# Patient Record
Sex: FEMALE | Race: WHITE | NOT HISPANIC OR LATINO | Employment: UNEMPLOYED | URBAN - METROPOLITAN AREA
[De-identification: names, ages, dates, MRNs, and addresses within clinical notes are randomized per-mention and may not be internally consistent; named-entity substitution may affect disease eponyms.]

---

## 2017-11-30 ENCOUNTER — OFFICE VISIT (OUTPATIENT)
Dept: PULMONOLOGY | Facility: CLINIC | Age: 57
End: 2017-11-30

## 2017-11-30 VITALS
RESPIRATION RATE: 16 BRPM | HEIGHT: 64 IN | HEART RATE: 83 BPM | DIASTOLIC BLOOD PRESSURE: 88 MMHG | WEIGHT: 174.8 LBS | TEMPERATURE: 97.6 F | SYSTOLIC BLOOD PRESSURE: 148 MMHG | BODY MASS INDEX: 29.84 KG/M2 | OXYGEN SATURATION: 95 %

## 2017-11-30 DIAGNOSIS — Z92.25 PERSONAL HISTORY OF IMMUNOSUPPRESSIVE THERAPY: ICD-10-CM

## 2017-11-30 DIAGNOSIS — I73.00 RAYNAUD'S DISEASE WITHOUT GANGRENE: ICD-10-CM

## 2017-11-30 DIAGNOSIS — M85.89 OSTEOPENIA OF MULTIPLE SITES: ICD-10-CM

## 2017-11-30 DIAGNOSIS — I77.6 VASCULITIS (HCC): ICD-10-CM

## 2017-11-30 DIAGNOSIS — R05.9 COUGH: Primary | ICD-10-CM

## 2017-11-30 PROCEDURE — 99204 OFFICE O/P NEW MOD 45 MIN: CPT | Performed by: NURSE PRACTITIONER

## 2017-11-30 PROCEDURE — 94726 PLETHYSMOGRAPHY LUNG VOLUMES: CPT | Performed by: NURSE PRACTITIONER

## 2017-11-30 PROCEDURE — 94375 RESPIRATORY FLOW VOLUME LOOP: CPT | Performed by: NURSE PRACTITIONER

## 2017-11-30 PROCEDURE — 94729 DIFFUSING CAPACITY: CPT | Performed by: NURSE PRACTITIONER

## 2017-11-30 RX ORDER — AZATHIOPRINE 50 MG/1
50 TABLET ORAL DAILY
COMMUNITY

## 2017-11-30 RX ORDER — PRAVASTATIN SODIUM 20 MG
1 TABLET ORAL DAILY
COMMUNITY
Start: 2017-09-01

## 2017-11-30 RX ORDER — ASPIRIN 81 MG/1
81 TABLET, CHEWABLE ORAL
COMMUNITY

## 2017-11-30 RX ORDER — AMITRIPTYLINE HYDROCHLORIDE 10 MG/1
1 TABLET, FILM COATED ORAL DAILY
COMMUNITY
Start: 2017-11-29

## 2017-11-30 RX ORDER — DULAGLUTIDE 1.5 MG/.5ML
INJECTION, SOLUTION SUBCUTANEOUS
COMMUNITY
Start: 2017-10-05

## 2017-11-30 RX ORDER — AMOXICILLIN AND CLAVULANATE POTASSIUM 875; 125 MG/1; MG/1
TABLET, FILM COATED ORAL
COMMUNITY
Start: 2017-11-22

## 2017-11-30 RX ORDER — METOPROLOL SUCCINATE 50 MG/1
1 TABLET, EXTENDED RELEASE ORAL DAILY
COMMUNITY
Start: 2017-11-06

## 2017-11-30 RX ORDER — LOSARTAN POTASSIUM 100 MG/1
1 TABLET ORAL DAILY
COMMUNITY
Start: 2017-10-16

## 2017-11-30 RX ORDER — LANSOPRAZOLE 30 MG/1
1 CAPSULE, DELAYED RELEASE ORAL DAILY
COMMUNITY
Start: 2017-10-06

## 2017-11-30 RX ORDER — ESCITALOPRAM OXALATE 20 MG/1
1 TABLET ORAL DAILY
COMMUNITY
Start: 2017-11-29

## 2017-11-30 RX ORDER — MELOXICAM 7.5 MG/1
7.5 TABLET ORAL DAILY
COMMUNITY

## 2017-11-30 RX ORDER — ROPINIROLE 1 MG/1
1 TABLET, FILM COATED ORAL DAILY
COMMUNITY
Start: 2017-09-01

## 2017-12-01 PROBLEM — M85.89 OSTEOPENIA OF MULTIPLE SITES: Status: ACTIVE | Noted: 2017-12-01

## 2017-12-01 PROBLEM — I77.6 VASCULITIS (HCC): Status: ACTIVE | Noted: 2017-12-01

## 2017-12-01 PROBLEM — I73.00 RAYNAUD'S DISEASE WITHOUT GANGRENE: Status: ACTIVE | Noted: 2017-12-01

## 2017-12-01 PROBLEM — Z92.25 PERSONAL HISTORY OF IMMUNOSUPPRESSIVE THERAPY: Status: ACTIVE | Noted: 2017-12-01

## 2017-12-01 NOTE — PROGRESS NOTES
[facility] Pulmonary Evaluation    CHIEF COMPLAINT    Cough    Refered by:  Dr Anna Lynn        HISTORY OF PRESENT ILLNESS    Angie Wellington is a 57 y.o.female here today for follow up on a cough.  She has had several bouts of pneumonia this year was hospitalized once for several days.  She had a CT scan done in August right after the most recent bout of pneumonia that did show some atelectasis and postinflammatory changes on the left.  At this point she is completed 3 rounds of antibiotics and feels significantly better.  She has no cough or sputum production.  No wheezing.  No fevers or chills.  She is currently on Augmentin for a sinus infection.  She has been on immunosuppressive therapy for around 4 years.  She has a history of vasculitis diagnosed in 2006 after she had a severe headache as well as weakness and vision changes which they felt was a CVA, but her workup was negative.  She follows up with Dr. Parker and Dr. Alonso at [place].    She does have some daytime fatigue and shortness of breath.  She does get a bit short of breath towards the end of the day.  She denies any headaches or dizziness in the mornings.  She has a chronic headache for her history of vasculitis.    Patient Active Problem List   Diagnosis   • Vasculitis   • Osteopenia of multiple sites   • Raynaud's disease without gangrene   • Personal history of immunosuppressive therapy       No Known Allergies    Current Outpatient Prescriptions:   •  amitriptyline (ELAVIL) 10 MG tablet, 1 tablet Daily., Disp: , Rfl:   •  amoxicillin-clavulanate (AUGMENTIN) 875-125 MG per tablet, , Disp: , Rfl:   •  aspirin 81 MG chewable tablet, Chew 81 mg., Disp: , Rfl:   •  azaTHIOprine (IMURAN) 50 MG tablet, Take 50 mg by mouth Daily., Disp: , Rfl:   •  Calcium-Magnesium-Vitamin D (CALCIUM 500 PO), 1,500 mg Daily., Disp: , Rfl:   •  escitalopram (LEXAPRO) 20 MG tablet, 1 tablet Daily., Disp: , Rfl:   •  insulin aspart (novoLOG FLEXPEN) 100 UNIT/ML solution  "pen-injector sc pen, Inject  under the skin 3 (Three) Times a Day With Meals., Disp: , Rfl:   •  Insulin Glargine (LANTUS SOLOSTAR) 100 UNIT/ML injection pen, Inject  under the skin., Disp: , Rfl:   •  lansoprazole (PREVACID) 30 MG capsule, 1 capsule Daily., Disp: , Rfl:   •  losartan (COZAAR) 100 MG tablet, 1 tablet Daily., Disp: , Rfl:   •  meloxicam (MOBIC) 7.5 MG tablet, Take 7.5 mg by mouth Daily., Disp: , Rfl:   •  metoprolol succinate XL (TOPROL-XL) 50 MG 24 hr tablet, 1 tablet Daily., Disp: , Rfl:   •  pravastatin (PRAVACHOL) 20 MG tablet, 1 tablet Daily., Disp: , Rfl:   •  RiTUXimab (RITUXAN) 10 MG/ML solution injection, Infuse  into a venous catheter Every 4 (Four) Months., Disp: , Rfl:   •  rOPINIRole (REQUIP) 1 MG tablet, 1 tablet Daily., Disp: , Rfl:   •  TRULICITY 1.5 MG/0.5ML solution pen-injector, , Disp: , Rfl:     MEDICATION LIST AND ALLERGIES REVIEWED.    Social History   Substance Use Topics   • Smoking status: Not on file   • Smokeless tobacco: Not on file   • Alcohol use Not on file       FAMILY AND SOCIAL HISTORY REVIEWED AND UPDATED IN EPIC    Review of Systems   Constitutional: Negative for chills, fatigue, fever and unexpected weight change.   HENT: Negative for congestion, nosebleeds, postnasal drip, rhinorrhea, sinus pressure and trouble swallowing.    Respiratory: Negative for cough, chest tightness, shortness of breath and wheezing.    Cardiovascular: Negative for chest pain and leg swelling.   Gastrointestinal: Negative for abdominal pain, constipation, diarrhea, nausea and vomiting.   Genitourinary: Negative for dysuria, frequency, hematuria and urgency.   Musculoskeletal: Negative for myalgias.   Neurological: Negative for dizziness, weakness, numbness and headaches.   All other systems reviewed and are negative.  .    /88  Pulse 83  Temp 97.6 °F (36.4 °C)  Resp 16  Ht 63.5\" (161.3 cm)  Wt 174 lb 12.8 oz (79.3 kg)  SpO2 95% Comment: RA  BMI 30.48 kg/m2  Physical Exam "   Constitutional: She is oriented to person, place, and time. She appears well-developed and well-nourished.   HENT:   Head: Normocephalic and atraumatic.   Eyes: EOM are normal. Pupils are equal, round, and reactive to light.   Neck: Normal range of motion. Neck supple.   Cardiovascular: Normal rate and regular rhythm.    No murmur heard.  Pulmonary/Chest: Effort normal and breath sounds normal. No respiratory distress. She has no wheezes. She has no rales.   Abdominal: Soft. Bowel sounds are normal. She exhibits no distension.   Musculoskeletal: Normal range of motion. She exhibits no edema.   Neurological: She is alert and oriented to person, place, and time.   Skin: Skin is warm and dry. No erythema.   Psychiatric: She has a normal mood and affect. Her behavior is normal.   Vitals reviewed.      RESULTS    CT Chest, 8/2017 [facility]  Small amount of pleural parenchymal banding opacity seen in the left base, mild atelectasis.  No nodules or adenopathy.    PFTs done in the office today, and read by me:  No obstruction or restriction, FVC 2.63 81 percent.  FEV1 2.16, 86%.  Total lung capacity 91%  Normal diffusion with an adjusted DLCO at 104.    PA/LAT CXR done in the office today, and reviewed by me:      PROBLEM LIST    Problem List Items Addressed This Visit        Cardiovascular and Mediastinum    Vasculitis    Raynaud's disease without gangrene       Musculoskeletal and Integument    Osteopenia of multiple sites       Other    Personal history of immunosuppressive therapy    Overview     On Imuran, chronic steroids,Rituximab for history of vasculitis           Other Visit Diagnoses     Cough    -  Primary    Relevant Orders    XR Chest PA & Lateral    Pulmonary Function Test (Completed)            DISCUSSION    Currently her systems are resolved, she has no cough, fever, shortness of breath or wheezing.  But with her abnormal CT scan and history of recurrent rhonchi dust and pneumonia issues  I like to follow-up and make sure that that abnormal area has cleared.  Given her immunocompromised state I would recommend a bronchoscopy if she has any further symptoms of worsening cough, congestion, bronchitis issues, or recurrent pneumonia.  We did discuss this in the office today and she is return to the clinic if any symptoms reappear.    We also discussed the need for a sleep study in the future.  She does have some evidence of daytime fatigue, dyspnea, and worsening shortness of breath towards the evenings.  She has never had a sleep study to rule out objective sleep apnea.  She would like to defer that at this time, follow up with her primary care provider closer to home.  She lives 3 hours away.    Again we discussed returning to the clinic with any episodes of worsening cough or congestion or a recurrent episode of bronchitis or pneumonia for further evaluation with bronchoscopy, she would be at high risk for an atypical infection.  I would like to follow-up on her CT scan to assure resolution of her atelectasis.    Tayler Valdez, APRN  11/30/20178:31 AM  Electronically signed     Please note that portions of this note were completed with a voice recognition program. Efforts were made to edit the dictations, but occasionally words are mistranscribed.      CC: Markie Delacruz, DO

## 2017-12-15 ENCOUNTER — TELEPHONE (OUTPATIENT)
Dept: PULMONOLOGY | Facility: CLINIC | Age: 57
End: 2017-12-15

## 2018-01-02 ENCOUNTER — HOSPITAL ENCOUNTER (OUTPATIENT)
Dept: PET IMAGING | Facility: HOSPITAL | Age: 58
Discharge: HOME | End: 2018-01-02
Admitting: NURSE PRACTITIONER

## 2018-01-02 PROCEDURE — 71250 CT THORAX DX C-: CPT

## 2021-11-23 ENCOUNTER — TRANSCRIBE ORDERS (OUTPATIENT)
Dept: PHYSICAL THERAPY | Facility: CLINIC | Age: 61
End: 2021-11-23

## 2021-11-23 ENCOUNTER — TREATMENT (OUTPATIENT)
Dept: PHYSICAL THERAPY | Facility: CLINIC | Age: 61
End: 2021-11-23

## 2021-11-23 DIAGNOSIS — M19.042 OSTEOARTHRITIS OF FINGER OF LEFT HAND: Primary | ICD-10-CM

## 2021-11-23 DIAGNOSIS — Z96.698 S/P FINGER JOINT REPLACEMENT: ICD-10-CM

## 2021-11-23 DIAGNOSIS — Z47.89 ORTHOPEDIC AFTERCARE: ICD-10-CM

## 2021-11-23 PROCEDURE — L3923 HFO WITHOUT JOINTS PRE CST: HCPCS | Performed by: PHYSICAL THERAPIST

## 2021-11-23 NOTE — PROGRESS NOTES
Angie Wellington 1960   Diagnosis/ Surgery: L index PIPJ advanced OA with deformity, s/p PIPJ replacement               Date Of Injury: chronic issue   Date Of Surgery:11/11/21    Hand Dominance: R   History of Present Condition: progressive severe OA, s/p PIPJ replacement with plates.   Medical/Vocational History/ Medications: retired. Does a lot of sewing.     Pain: 0/10    Edema: mod-sev index   Sensibility: WNL   Wound Status: dorsal PIPJ, stitches removed.   ROM/ Strength: NT     Splinting:  · Patient was measure and fit with a custom fabricated digital gutter splint.    · Patient was instructed in wearing schedule, precautions and care of the splint during this visit.   · Patient was instructed in proper donning/doffing of splint.   Assessment:  · Patient was fitted and appropriate splint was fabricated this date.  · Patient reported that splint was comfortable and had no complications with the fit of the splint.  · Patient was instructed and patient verbalized understanding of precautions, wear and care of the splint.   · Patient demonstrated independent donning/doffing of splint during treatment today.  Goals:  · Patient was fitted properly with appropriate splint for diagnosis  · Patient was educated on precautions, wear schedule and care of splint  · Patient demonstrated independence with donning/doffing of the splint.  · Splint was provided to Protect Healing Structures, Restrict Mobility, Improve joint alignment.  Plan:  · No additional treatment is required for this patient at this time. The patient is therefore discharged from therapy.  · Patient advised to contact therapist with any additional questions or concerns regarding the fit and function of the splint.  · Patient will be seen for splint issues as needed   · Wear Instructions: Off for hygiene       PT SIGNATURE: Maia Solitario, PT   DATE TREATMENT INITIATED: 11/23/2021    Physician Signature____________________________________ Date____________

## 2022-01-14 ENCOUNTER — TRANSCRIBE ORDERS (OUTPATIENT)
Dept: PHYSICAL THERAPY | Facility: CLINIC | Age: 62
End: 2022-01-14

## 2022-01-14 ENCOUNTER — TREATMENT (OUTPATIENT)
Dept: PHYSICAL THERAPY | Facility: CLINIC | Age: 62
End: 2022-01-14

## 2022-01-14 DIAGNOSIS — M19.042 OSTEOARTHRITIS OF FINGER OF LEFT HAND: Primary | ICD-10-CM

## 2022-01-14 DIAGNOSIS — Z96.698 S/P FINGER JOINT REPLACEMENT: Primary | ICD-10-CM

## 2022-01-14 DIAGNOSIS — Z47.89 ORTHOPEDIC AFTERCARE: ICD-10-CM

## 2022-01-14 DIAGNOSIS — M19.042 OSTEOARTHRITIS OF FINGER OF LEFT HAND: ICD-10-CM

## 2022-01-14 DIAGNOSIS — Z96.698 S/P FINGER JOINT REPLACEMENT: ICD-10-CM

## 2022-01-14 PROCEDURE — L3923 HFO WITHOUT JOINTS PRE CST: HCPCS | Performed by: PHYSICAL THERAPIST

## 2022-01-14 NOTE — PROGRESS NOTES
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 Demonstrated bent in plate and ulnar drift at PIPJ. Here today for new digital splint to help block ulnar deviation at PIP joint and increase PIPJ extension.   Medical/Vocational History/ Medications: retired. Does a lot of sewing.     Pain: minimal     Edema: moderate index   Sensibility: WNL   Wound Status: dorsal PIPJ, stitch removed   ROM/ Strength: NT     Splinting:  · Patient was measure and fit with a custom fabricated digital gutter splint with ulnar wall.    · Patient was instructed in wearing schedule, precautions and care of the splint during this visit.   · Patient was instructed in proper donning/doffing of splint.   Assessment:  · Patient was fitted and appropriate splint was fabricated this date.  · Patient reported that splint was comfortable and had no complications with the fit of the splint.  · Patient was instructed and patient verbalized understanding of precautions, wear and care of the splint.   · Patient demonstrated independent donning/doffing of splint during treatment today.  Goals:  · Patient was fitted properly with appropriate splint for diagnosis  · Patient was educated on precautions, wear schedule and care of splint  · Patient demonstrated independence with donning/doffing of the splint.  · Splint was provided to Protect Healing Structures, Restrict Mobility, Improve joint alignment.  Plan:  · No additional treatment is required for this patient at this time. The patient is therefore discharged from therapy.  · Patient advised to contact therapist with any additional  questions or concerns regarding the fit and function of the splint.  · Patient will be seen for splint issues as needed   · Wear Instructions: Off for hygiene           PT SIGNATURE: Maia Solitario, PT   DATE TREATMENT INITIATED: 1/14/2022    Medicare Initial Certification  Certification Period: 4/14/2022  I certify that the therapy services are furnished while this patient is under my care.  The services outlined above are required by this patient, and will be reviewed every 90 days.     PHYSICIAN: Pao Hoyos MD      DATE:     Please sign and return via fax to [phone].. Thank you, [facility].

## 2023-08-26 NOTE — TELEPHONE ENCOUNTER
Ms Wellington has actually not had her CT scan performed yet. She had been contacted by central scheduling to get this scheduled but was unsure why it was being performed. We discussed the abnormality on her previous scan and the need for repeat scan. She is agreeable. She was provided with central scheduling's number to make an appointment for her scan.   
Pt called requesting results from CT. Please call pt @ [phone].  
Opt out

## 2024-07-22 PROBLEM — Z79.899 HIGH RISK MEDICATION USE: Status: ACTIVE | Noted: 2024-07-22

## 2024-07-22 PROBLEM — R53.82 CHRONIC FATIGUE: Status: ACTIVE | Noted: 2024-07-22

## 2024-07-22 PROBLEM — M81.0 AGE-RELATED OSTEOPOROSIS WITHOUT CURRENT PATHOLOGICAL FRACTURE: Status: ACTIVE | Noted: 2024-07-22

## 2024-07-22 PROBLEM — I77.6 CNS VASCULITIS: Status: ACTIVE | Noted: 2024-07-22

## 2024-07-22 PROBLEM — L40.50 PSORIATIC ARTHRITIS: Status: ACTIVE | Noted: 2024-07-22

## 2024-07-22 NOTE — ASSESSMENT & PLAN NOTE
DEXA: 10/19/2020: Improved in all areas with osteopenia in the lumbar spine and left hip.  Getting infusions of Reclast (since 7/2017; 9/2018, and 1/2020, 2/2021, 3/2022)  She had taken Fosamax for years prior to this   - Bone density today 07/13/23 which showed improvement in all areas with osteopenia and the lumbar spine and left hip.     last Reclast infusion was 3/2/22  - Continue drug holiday  - Order bone density after 07/13/25  - continue daily weight bearing exercise  - continue vitamin D supplementation  - calcium in diet

## 2024-07-22 NOTE — PROGRESS NOTES
Office Follow Up      Date: 07/24/2024   Patient Name: Angie Wellington  MRN: [id number]  YOB: 1960    Referring Physician: Markie Delacruz DO     Chief Complaint: Psoriatic arthritis and h/o CNS vasculitis.       History of Present Illness: Angie Wellington is a 64 y.o. female who is here today for follow up on Psoriatic arthritis an h/o CNS vasculitis.     She states she is about the same to slightly better.  She rates her pain 5/10 with 2 hours of morning stiffness.   She is currently on Methotrexate 20 mg weekly, daily folic acid, and Cosentyx. She thinks the Cosentyx may help.     We also prescribe her meloxicam and ropinirole. She reports she only takes meloxicam when she is hurting severely.  She has been getting reclast but is currently on a drug holiday. DEXA 7/2023 was stable.    She has no current symptoms of CNS vasculitis.  Strong family history of psoriasis and RA. Sister diagnosed with seronegative RA.      Pain scale: 5/10. The primary symptoms reported include: pain. Pertinent negatives include fever, fatigue, AM stiffness, change in vision, skin lesion(s), chest pain, changing cough, edema and abdominal pain.      Subjective   Review of Systems: Review of Systems   Constitutional:  Negative for chills, fatigue, fever and unexpected weight loss.   HENT:  Negative for dental problem, mouth sores, sinus pressure and swollen glands.         Dry mouth   Dry eyes     Eyes:  Negative for photophobia, pain and redness.   Respiratory:  Negative for apnea, cough, chest tightness and shortness of breath.    Cardiovascular:  Negative for chest pain and leg swelling.        Raynauds  Tachycardia     Gastrointestinal:  Positive for GERD. Negative for abdominal pain, diarrhea and nausea.   Endocrine: Positive for heat intolerance and polyuria.   Genitourinary:  Negative for dysuria and hematuria.   Musculoskeletal:  Positive for joint swelling.   Skin:  Negative for dry skin,  rash, skin lesions and bruise.   Neurological:  Negative for dizziness, seizures, syncope, weakness, headache and memory problem.   Hematological:  Negative for adenopathy. Does not bruise/bleed easily.   Psychiatric/Behavioral:  Negative for sleep disturbance, suicidal ideas, depressed mood and stress. The patient is nervous/anxious.         Medications:   Current Outpatient Medications:     amitriptyline (ELAVIL) 10 MG tablet, Take 1 tablet by mouth Every Night., Disp: 90 tablet, Rfl: 1    amoxicillin-clavulanate (AUGMENTIN) 875-125 MG per tablet, , Disp: , Rfl:     Calcium-Magnesium-Vitamin D (CALCIUM 500 PO), 1,500 mg Daily., Disp: , Rfl:     escitalopram (LEXAPRO) 20 MG tablet, 1 tablet Daily., Disp: , Rfl:     folic acid (FOLVITE) 1 MG tablet, Take 1 tablet by mouth Daily., Disp: 90 tablet, Rfl: 1    hydrALAZINE (APRESOLINE) 25 MG tablet, Take 1 tablet by mouth 2 (Two) Times a Day With Meals., Disp: , Rfl:     lansoprazole (PREVACID) 30 MG capsule, 1 capsule Daily., Disp: , Rfl:     Lipitor 40 MG tablet, Take 1 tablet by mouth Daily., Disp: , Rfl:     losartan (COZAAR) 100 MG tablet, 1 tablet Daily., Disp: , Rfl:     meloxicam (MOBIC) 7.5 MG tablet, Take 1 tablet by mouth Daily., Disp: , Rfl:     metFORMIN (GLUCOPHAGE) 1000 MG tablet, TAKE 1 TABLET TWICE DAILY WITH MORNING AND EVENING MEALS, Disp: , Rfl:     methocarbamol (ROBAXIN) 500 MG tablet, Take 2 tablets by mouth 2 (Two) Times a Day., Disp: , Rfl:     methotrexate 2.5 MG tablet, Take 8 tablets by mouth 1 (One) Time Per Week., Disp: 96 tablet, Rfl: 0    metoprolol succinate XL (TOPROL-XL) 50 MG 24 hr tablet, 1 tablet Daily., Disp: , Rfl:     Norvasc 2.5 MG tablet, Take 1 tablet by mouth Daily., Disp: , Rfl:     ondansetron (ZOFRAN) 8 MG tablet, Take 1 tablet by mouth every eight hours as needed, Disp: 10 tablet, Rfl: 0    ondansetron (ZOFRAN) 8 MG tablet, Take 1 tablet by mouth Every 8 (Eight) Hours As Needed., Disp: 10 tablet, Rfl: 0    RiTUXimab  "(RITUXAN) 10 MG/ML solution injection, Infuse  into a venous catheter Every 4 (Four) Months., Disp: , Rfl:     rOPINIRole (REQUIP) 1 MG tablet, Take 1 tablet by mouth Every Night., Disp: 90 tablet, Rfl: 0    Secukinumab (Cosentyx Sensoready Pen) 150 MG/ML solution auto-injector, Inject 150 mg under the skin into the appropriate area as directed Every 28 (Twenty-Eight) Days., Disp: , Rfl:     zoledronic acid (Reclast) 5 MG/100ML solution infusion, Reclast 5 mg/100 mL intravenous piggyback  Inject by intravenous route., Disp: , Rfl:     aspirin 81 MG chewable tablet, Chew 81 mg. (Patient not taking: Reported on 7/24/2024), Disp: , Rfl:     azaTHIOprine (IMURAN) 50 MG tablet, Take 50 mg by mouth Daily. (Patient not taking: Reported on 7/24/2024), Disp: , Rfl:     HYDROcodone-acetaminophen (NORCO) 7.5-325 MG per tablet, Take 1 tablet by mouth Every 6 (Six) Hours As Needed for pain. (Patient not taking: Reported on 7/24/2024), Disp: 20 tablet, Rfl: 0    insulin aspart (novoLOG FLEXPEN) 100 UNIT/ML solution pen-injector sc pen, Inject  under the skin 3 (Three) Times a Day With Meals. (Patient not taking: Reported on 7/24/2024), Disp: , Rfl:     Insulin Glargine (LANTUS SOLOSTAR) 100 UNIT/ML injection pen, Inject  under the skin. (Patient not taking: Reported on 7/24/2024), Disp: , Rfl:     TRULICITY 1.5 MG/0.5ML solution pen-injector, , Disp: , Rfl:     Allergies:   Allergies   Allergen Reactions    Sulfamethoxazole Nausea Only    Trimethoprim Nausea Only    Clindamycin Rash       I have reviewed and updated the patient's chief complaint, history of present illness, review of systems, past medical history, surgical history, family history, social history, medications and allergy list as appropriate.     Objective    Vital Signs:   Vitals:    07/24/24 1027   BP: 110/70   Pulse: 70   Temp: 97.7 °F (36.5 °C)   Weight: 78.5 kg (173 lb)   Height: 160 cm (63\")   PainSc:   6   PainLoc: Hand     Body mass index is 30.65 " kg/m².    Physical Exam:  General: The patient is well-developed and well nourished. Cooperative, alert and oriented x3. Affect is normal. Hydration appears normal.   HEENT: Normocephalic and atraumatic. No notable alopecia. Lids and conjunctiva are normal. Pupils are equal and sclera are clear. Oropharynx is clear   NECK: Supple without adenopathy, masses or thyromegaly.   CARDIOVASCULAR: Regular rate and rhythm. No murmurs, rubs or gallops   LUNGS: Effort is normal. Lungs are clear bilaterally.  ABDOMEN: Soft and non-tender without masses or hepatosplenomegaly..   EXTREMITIES: No edema.  No cyanosis or clubbing.  SKIN: Inspection and palpation are normal.  NEUROLOGIC: Gait is normal.  Deep tendon reflexes are 2+ and symmetric.  MUSCULOSKELETAL: Complete joint exam was performed. There was full range of motion of the shoulders, elbows, wrists and hands without soft tissue swelling synovitis or deformities except as noted. Fusion of left 2nd PIP from surgery. 2nd digit is subluxing towards the left. Degenerative changes are present.  Hips have good flexion and internal and external rotation.  Knees have no palpable effusions.  There is full extension and flexion.  Ankles and feet have no soft tissue swelling or synovitis.  BACK:  Straight without scoliosis  Joint Exam 07/24/2024     The following joints were examined and normal:   Left Glenohumeral, Right Glenohumeral, Left Elbow, Right Elbow, Left Wrist, Right Wrist, Left MCP 1, Right MCP 1, Left MCP 2, Right MCP 2, Left MCP 3, Right MCP 3, Left MCP 4, Right MCP 4, Left MCP 5, Right MCP 5, Left IP (thumb), Right IP (thumb), Left PIP 2 (finger), Right PIP 2 (finger), Left PIP 3 (finger), Right PIP 3 (finger), Left PIP 4 (finger), Right PIP 4 (finger), Left PIP 5 (finger), Right PIP 5 (finger), Left Knee, Right Knee       Patient Global: 5 mm  Provider Global: 1 mm    Assessment / Plan      Assessment & Plan  Psoriatic arthritis  Based on physical exam and review of  most recent X rays 6/2018, suspect psoriatic arthritis  XR of the hands 6/2018 with changes consistent with erosive OA vs. 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    I see no active diease.   Swollen joint count is: 0, Tender joint count is: 0, Physician global is: 1, VAS is: 5, Patient global is: 5.  She seems to be doing well with this dose of Cosentyx  - Continue MTX 8 tablets weekly  - Continue daily folic acid   - continue meloxicam as needed  - she will continue to get labs every 8-12 weeks on medications  - RTC 3 months  CNS vasculitis  Dx: 2006 by Dr. Parker and Dr. Robles based on appearance of angiogram.  No brain biopsy.  She had an updated MRI brain and MRA without progression or evidence of ongoing inflammation in 2017 at [facility].  Previous Rx: Rituximab, Imuran,   Current Rx: Methotrexate (7/2020)   s/p Workup at [facility] with no findings of active CNS vasculitis.  Previously on Cytoxan and prednisone.  Currently no symptoms.     High risk medication use  MTX, Cosentyx.     CBC, CMP, ESR, and CRP every 12 weeks on current medications - new standing order provided.   - QTB and hepatitis panel were negative 10/6/23  - hepatitis panel negative 8/2/22  - Patient needs a Hepatitis panel every 5 years and QTB yearly.      We discussed the possible side effects of methotrexate including, but not limited to: oral ulcers, nausea, diarrhea, rash, increased infection risk, bone marrow suppression, hepatitis, pulmonary toxicity.   We discussed the need to avoid alcohol and to have regulatory lab monitoring done while taking methotrexate. We discussed that live virus vaccines are contraindicated while taking methotrexate  Chronic fatigue    Age-related  osteoporosis without current pathological fracture  DEXA: 10/19/2020: Improved in all areas with osteopenia in the lumbar spine and left hip.  Getting infusions of Reclast (since 7/2017; 9/2018, and 1/2020, 2/2021, 3/2022)  She had taken Fosamax for years prior to this   - Bone density today 07/13/23 which showed improvement in all areas with osteopenia and the lumbar spine and left hip.     last Reclast infusion was 3/2/22  - Continue drug holiday  - Order bone density after 07/13/25  - continue daily weight bearing exercise  - continue vitamin D supplementation  - calcium in diet    Orders Placed This Encounter   Procedures    CBC Auto Differential    Comprehensive Metabolic Panel    C-reactive Protein    Sedimentation Rate     New Medications Ordered This Visit   Medications    amitriptyline (ELAVIL) 10 MG tablet     Sig: Take 1 tablet by mouth Every Night.     Dispense:  90 tablet     Refill:  1    folic acid (FOLVITE) 1 MG tablet     Sig: Take 1 tablet by mouth Daily.     Dispense:  90 tablet     Refill:  1    methotrexate 2.5 MG tablet     Sig: Take 8 tablets by mouth 1 (One) Time Per Week.     Dispense:  96 tablet     Refill:  0    rOPINIRole (REQUIP) 1 MG tablet     Sig: Take 1 tablet by mouth Every Night.     Dispense:  90 tablet     Refill:  0          Follow Up:   Return in about 3 months (around 10/24/2024).        Lane Magallon MD  [facility]

## 2024-07-22 NOTE — ASSESSMENT & PLAN NOTE
Dx: 2006 by Dr. Parker and Dr. Robles based on appearance of angiogram.  No brain biopsy.  She had an updated MRI brain and MRA without progression or evidence of ongoing inflammation in 2017 at [facility].  Previous Rx: Rituximab, Imuran,   Current Rx: Methotrexate (7/2020)   s/p Workup at [facility] with no findings of active CNS vasculitis.  Previously on Cytoxan and prednisone.  Currently no symptoms.

## 2024-07-22 NOTE — ASSESSMENT & PLAN NOTE
Based on physical exam and review of most recent X rays 6/2018, suspect psoriatic arthritis  XR of the hands 6/2018 with changes consistent with erosive OA vs. 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    I see no active diease.   Swollen joint count is: 0, Tender joint count is: 0, Physician global is: 1, VAS is: 5, Patient global is: 5.  She seems to be doing well with this dose of Cosentyx  - Continue MTX 8 tablets weekly  - Continue daily folic acid   - continue meloxicam as needed  - she will continue to get labs every 8-12 weeks on medications  - RTC 3 months

## 2024-07-22 NOTE — ASSESSMENT & PLAN NOTE
MTX, Cosentyx.     CBC, CMP, ESR, and CRP every 12 weeks on current medications - new standing order provided.   - QTB and hepatitis panel were negative 10/6/23  - hepatitis panel negative 8/2/22  - Patient needs a Hepatitis panel every 5 years and QTB yearly.      We discussed the possible side effects of methotrexate including, but not limited to: oral ulcers, nausea, diarrhea, rash, increased infection risk, bone marrow suppression, hepatitis, pulmonary toxicity.   We discussed the need to avoid alcohol and to have regulatory lab monitoring done while taking methotrexate. We discussed that live virus vaccines are contraindicated while taking methotrexate

## 2024-07-24 ENCOUNTER — OFFICE VISIT (OUTPATIENT)
Age: 64
End: 2024-07-24
Payer: MEDICARE

## 2024-07-24 ENCOUNTER — LAB (OUTPATIENT)
Facility: HOSPITAL | Age: 64
End: 2024-07-24
Payer: MEDICARE

## 2024-07-24 VITALS
DIASTOLIC BLOOD PRESSURE: 70 MMHG | WEIGHT: 173 LBS | BODY MASS INDEX: 30.65 KG/M2 | SYSTOLIC BLOOD PRESSURE: 110 MMHG | TEMPERATURE: 97.7 F | HEIGHT: 63 IN | HEART RATE: 70 BPM

## 2024-07-24 DIAGNOSIS — R53.82 CHRONIC FATIGUE: ICD-10-CM

## 2024-07-24 DIAGNOSIS — I77.6 CNS VASCULITIS: ICD-10-CM

## 2024-07-24 DIAGNOSIS — M81.0 AGE-RELATED OSTEOPOROSIS WITHOUT CURRENT PATHOLOGICAL FRACTURE: ICD-10-CM

## 2024-07-24 DIAGNOSIS — Z79.899 HIGH RISK MEDICATION USE: ICD-10-CM

## 2024-07-24 DIAGNOSIS — L40.50 PSORIATIC ARTHRITIS: Primary | ICD-10-CM

## 2024-07-24 LAB
BASOPHILS # BLD AUTO: 0.03 10*3/MM3 (ref 0–0.2)
BASOPHILS NFR BLD AUTO: 0.4 % (ref 0–1.5)
CRP SERPL-MCNC: 0.86 MG/DL (ref 0–0.5)
DEPRECATED RDW RBC AUTO: 42.7 FL (ref 37–54)
EOSINOPHIL # BLD AUTO: 0.21 10*3/MM3 (ref 0–0.4)
EOSINOPHIL NFR BLD AUTO: 3 % (ref 0.3–6.2)
ERYTHROCYTE [DISTWIDTH] IN BLOOD BY AUTOMATED COUNT: 13.5 % (ref 12.3–15.4)
HCT VFR BLD AUTO: 35.4 % (ref 34–46.6)
HGB BLD-MCNC: 11.7 G/DL (ref 12–15.9)
IMM GRANULOCYTES # BLD AUTO: 0.02 10*3/MM3 (ref 0–0.05)
IMM GRANULOCYTES NFR BLD AUTO: 0.3 % (ref 0–0.5)
LYMPHOCYTES # BLD AUTO: 1.38 10*3/MM3 (ref 0.7–3.1)
LYMPHOCYTES NFR BLD AUTO: 19.4 % (ref 19.6–45.3)
MCH RBC QN AUTO: 29 PG (ref 26.6–33)
MCHC RBC AUTO-ENTMCNC: 33.1 G/DL (ref 31.5–35.7)
MCV RBC AUTO: 87.6 FL (ref 79–97)
MONOCYTES # BLD AUTO: 0.49 10*3/MM3 (ref 0.1–0.9)
MONOCYTES NFR BLD AUTO: 6.9 % (ref 5–12)
NEUTROPHILS NFR BLD AUTO: 4.97 10*3/MM3 (ref 1.7–7)
NEUTROPHILS NFR BLD AUTO: 70 % (ref 42.7–76)
NRBC BLD AUTO-RTO: 0 /100 WBC (ref 0–0.2)
PLATELET # BLD AUTO: 498 10*3/MM3 (ref 140–450)
PMV BLD AUTO: 10.1 FL (ref 6–12)
RBC # BLD AUTO: 4.04 10*6/MM3 (ref 3.77–5.28)
WBC NRBC COR # BLD AUTO: 7.1 10*3/MM3 (ref 3.4–10.8)

## 2024-07-24 PROCEDURE — 80053 COMPREHEN METABOLIC PANEL: CPT

## 2024-07-24 PROCEDURE — 99214 OFFICE O/P EST MOD 30 MIN: CPT | Performed by: INTERNAL MEDICINE

## 2024-07-24 PROCEDURE — 1159F MED LIST DOCD IN RCRD: CPT | Performed by: INTERNAL MEDICINE

## 2024-07-24 PROCEDURE — 85652 RBC SED RATE AUTOMATED: CPT

## 2024-07-24 PROCEDURE — 36415 COLL VENOUS BLD VENIPUNCTURE: CPT

## 2024-07-24 PROCEDURE — 1160F RVW MEDS BY RX/DR IN RCRD: CPT | Performed by: INTERNAL MEDICINE

## 2024-07-24 PROCEDURE — 86140 C-REACTIVE PROTEIN: CPT

## 2024-07-24 PROCEDURE — 85025 COMPLETE CBC W/AUTO DIFF WBC: CPT

## 2024-07-24 RX ORDER — FOLIC ACID 1 MG/1
1 TABLET ORAL DAILY
Qty: 90 TABLET | Refills: 1 | Status: SHIPPED | OUTPATIENT
Start: 2024-07-24

## 2024-07-24 RX ORDER — ZOLEDRONIC ACID 5 MG/100ML
INJECTION, SOLUTION INTRAVENOUS
COMMUNITY

## 2024-07-24 RX ORDER — METHOTREXATE 2.5 MG/1
20 TABLET ORAL WEEKLY
Qty: 96 TABLET | Refills: 0 | Status: SHIPPED | OUTPATIENT
Start: 2024-07-24

## 2024-07-24 RX ORDER — ROPINIROLE 1 MG/1
1 TABLET, FILM COATED ORAL NIGHTLY
Qty: 90 TABLET | Refills: 0 | Status: SHIPPED | OUTPATIENT
Start: 2024-07-24

## 2024-07-24 RX ORDER — AMLODIPINE BESYLATE 2.5 MG
1 TABLET ORAL DAILY
COMMUNITY
Start: 2024-07-22

## 2024-07-24 RX ORDER — ATORVASTATIN CALCIUM 40 MG
1 TABLET ORAL DAILY
COMMUNITY
Start: 2024-05-01

## 2024-07-24 RX ORDER — METHOCARBAMOL 500 MG/1
2 TABLET, FILM COATED ORAL 2 TIMES DAILY
COMMUNITY
Start: 2024-07-22

## 2024-07-24 RX ORDER — AMITRIPTYLINE HYDROCHLORIDE 10 MG/1
10 TABLET, FILM COATED ORAL NIGHTLY
Qty: 90 TABLET | Refills: 1 | Status: SHIPPED | OUTPATIENT
Start: 2024-07-24

## 2024-07-25 LAB
ALBUMIN SERPL-MCNC: 4.3 G/DL (ref 3.5–5.2)
ALBUMIN/GLOB SERPL: 1.8 G/DL
ALP SERPL-CCNC: 88 U/L (ref 39–117)
ALT SERPL W P-5'-P-CCNC: 30 U/L (ref 1–33)
ANION GAP SERPL CALCULATED.3IONS-SCNC: 12 MMOL/L (ref 5–15)
AST SERPL-CCNC: 23 U/L (ref 1–32)
BILIRUB SERPL-MCNC: 0.3 MG/DL (ref 0–1.2)
BUN SERPL-MCNC: 13 MG/DL (ref 8–23)
BUN/CREAT SERPL: 17.1 (ref 7–25)
CALCIUM SPEC-SCNC: 9.6 MG/DL (ref 8.6–10.5)
CHLORIDE SERPL-SCNC: 96 MMOL/L (ref 98–107)
CO2 SERPL-SCNC: 25 MMOL/L (ref 22–29)
CREAT SERPL-MCNC: 0.76 MG/DL (ref 0.57–1)
EGFRCR SERPLBLD CKD-EPI 2021: 86.5 ML/MIN/1.73
ERYTHROCYTE [SEDIMENTATION RATE] IN BLOOD: 19 MM/HR (ref 0–30)
GLOBULIN UR ELPH-MCNC: 2.4 GM/DL
GLUCOSE SERPL-MCNC: 108 MG/DL (ref 65–99)
POTASSIUM SERPL-SCNC: 4.2 MMOL/L (ref 3.5–5.2)
PROT SERPL-MCNC: 6.7 G/DL (ref 6–8.5)
SODIUM SERPL-SCNC: 133 MMOL/L (ref 136–145)

## 2024-08-12 ENCOUNTER — TELEPHONE (OUTPATIENT)
Age: 64
End: 2024-08-12
Payer: MEDICARE

## 2024-08-12 NOTE — TELEPHONE ENCOUNTER
Provider: DR GROVE    Caller: DORIS AMEZQUITA    Relationship to Patient: SELF    Pharmacy: NEED COMPANY NAME FOR MEDICATION    Phone Number: [phone]    Reason for Call: PT IS IN NEED OF THE COMPANY SHE PREVIOUSLY ORDER THIS MEDICATION FROM   Secukinumab (Cosentyx Sensoready Pen) 150 MG/ML solution auto-injector     PLEASE CALL TO ADVISE.    PT IS RUNNING LATE FOR TH NEXT INJECTION

## 2024-08-14 ENCOUNTER — SPECIALTY PHARMACY (OUTPATIENT)
Age: 64
End: 2024-08-14
Payer: MEDICARE

## 2024-10-28 NOTE — ASSESSMENT & PLAN NOTE
Dx: 2006 by Dr. Parker and Dr. Robles based on appearance of angiogram.  No brain biopsy.  She had an updated MRI brain and MRA without progression or evidence of ongoing inflammation in 2017 at [facility].  Previous Rx: Rituximab, Imuran,   Current Rx: Methotrexate (7/2020)   s/p Workup at [facility] with no findings of active CNS vasculitis.  Previously on Cytoxan and prednisone.  Currently no symptoms except stable chronic HA's. .

## 2024-10-28 NOTE — ASSESSMENT & PLAN NOTE
Based on physical exam and review of most recent X rays 6/2018, suspect psoriatic arthritis  XR of the hands 6/2018 with changes consistent with erosive OA vs. 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    I see no active swelling or synovitis. .   Swollen joint count is: 0, Tender joint count is: 0, Physician global is: 1, VAS is: 7, Patient global is: 5.  She seems to be doing well with this dose of Cosentyx  - Continue MTX 8 tablets weekly  - Continue daily folic acid   - continue meloxicam as needed  - she will continue to get labs every 8-12 weeks on medications  - RTC 3 months

## 2024-10-28 NOTE — PROGRESS NOTES
Office Follow Up      Date: 10/30/2024   Patient Name: Angie Wellington  MRN: [id number]  YOB: 1960    Referring Physician: Soledad Chin DO     Chief Complaint: Psoriatic arthritis and h/o CNS vasculitis.       History of Present Illness: Angie Wellington is a 64 y.o. female who is here today for follow up on Psoriatic arthritis and h/o CNS vasculitis.     She is having bilateral hand pain. No joint swelling.     She rates her pain 7/10 with 2 hours of morning stiffness.   She is currently on Methotrexate 20 mg weekly, daily folic acid, and Cosentyx. She thinks the Cosentyx may help.   Skin is doing well.     We also prescribe her meloxicam and ropinirole. She reports she only takes meloxicam when she is hurting severely.  She would like RF on Meloxicam.   She had been getting reclast but is currently on a drug holiday. DEXA 7/2023 was stable.    She has no current symptoms of CNS vasculitis.  Strong family history of psoriasis and RA. Sister diagnosed with seronegative RA.      Pain scale: 7/10.   The primary symptoms reported include: pain. Pertinent negatives include fever, fatigue, AM stiffness, change in vision, skin lesion(s), chest pain, changing cough, edema and abdominal pain.      Subjective   Review of Systems: Review of Systems   Constitutional:  Negative for chills, fatigue, fever and unexpected weight loss.   HENT:  Positive for congestion and sinus pressure. Negative for dental problem, mouth sores and swollen glands.         Dry mouth   Dry eyes     Eyes:  Negative for photophobia, pain and redness.   Respiratory:  Negative for apnea, cough, chest tightness and shortness of breath.    Cardiovascular:  Negative for chest pain and leg swelling.        Raynauds  Tachycardia     Gastrointestinal:  Positive for GERD. Negative for abdominal pain, diarrhea and nausea.   Endocrine: Positive for heat intolerance and polyuria.   Genitourinary:  Negative for dysuria and  hematuria.   Musculoskeletal:  Positive for joint swelling.   Skin:  Negative for dry skin, rash, skin lesions and bruise.   Neurological:  Positive for memory problem. Negative for dizziness, seizures, syncope, weakness and headache.   Hematological:  Negative for adenopathy. Does not bruise/bleed easily.   Psychiatric/Behavioral:  Negative for sleep disturbance, suicidal ideas, depressed mood and stress. The patient is nervous/anxious.         Medications:   Current Outpatient Medications:     amitriptyline (ELAVIL) 25 MG tablet, Take 1 tablet by mouth Every Night., Disp: 90 tablet, Rfl: 1    amoxicillin-clavulanate (AUGMENTIN) 875-125 MG per tablet, , Disp: , Rfl:     aspirin 81 MG chewable tablet, Chew 1 tablet., Disp: , Rfl:     Calcium-Magnesium-Vitamin D (CALCIUM 500 PO), 1,500 mg Daily., Disp: , Rfl:     escitalopram (LEXAPRO) 20 MG tablet, 1 tablet Daily., Disp: , Rfl:     folic acid (FOLVITE) 1 MG tablet, Take 1 tablet by mouth Daily., Disp: 90 tablet, Rfl: 1    hydrALAZINE (APRESOLINE) 25 MG tablet, Take 1 tablet by mouth 2 (Two) Times a Day With Meals., Disp: , Rfl:     lansoprazole (PREVACID) 30 MG capsule, 1 capsule Daily., Disp: , Rfl:     levocetirizine (XYZAL) 5 MG tablet, , Disp: , Rfl:     losartan (COZAAR) 100 MG tablet, 1 tablet Daily., Disp: , Rfl:     meloxicam (MOBIC) 7.5 MG tablet, Take 1 tablet by mouth Daily., Disp: 90 tablet, Rfl: 1    metFORMIN (GLUCOPHAGE) 1000 MG tablet, TAKE 1 TABLET TWICE DAILY WITH MORNING AND EVENING MEALS, Disp: , Rfl:     methocarbamol (ROBAXIN) 500 MG tablet, Take 2 tablets by mouth 2 (Two) Times a Day., Disp: , Rfl:     methotrexate 2.5 MG tablet, Take 8 tablets by mouth 1 (One) Time Per Week., Disp: 96 tablet, Rfl: 0    metoprolol succinate XL (TOPROL-XL) 50 MG 24 hr tablet, 1 tablet Daily., Disp: , Rfl:     Norvasc 2.5 MG tablet, Take 1 tablet by mouth Daily., Disp: , Rfl:     promethazine-dextromethorphan (PROMETHAZINE-DM) 6.25-15 MG/5ML syrup, Take 5 mL by  mouth Every 6 (Six) Hours., Disp: , Rfl:     rOPINIRole (REQUIP) 1 MG tablet, Take 1 tablet by mouth Every Night., Disp: 90 tablet, Rfl: 0    Secukinumab (Cosentyx Sensoready Pen) 150 MG/ML solution auto-injector, Inject 150 mg under the skin into the appropriate area as directed Every 28 (Twenty-Eight) Days., Disp: , Rfl:     zoledronic acid (Reclast) 5 MG/100ML solution infusion, Reclast 5 mg/100 mL intravenous piggyback  Inject by intravenous route., Disp: , Rfl:     Clopidogrel Bisulfate (PLAVIX PO), , Disp: , Rfl:     doxycycline (VIBRAMYCIN) 100 MG capsule, Take 1 capsule by mouth Every 12 (Twelve) Hours. (Patient not taking: Reported on 10/30/2024), Disp: , Rfl:     HYDROcodone-acetaminophen (NORCO) 7.5-325 MG per tablet, Take 1 tablet by mouth Every 6 (Six) Hours As Needed for pain. (Patient not taking: Reported on 10/30/2024), Disp: 20 tablet, Rfl: 0    insulin aspart (novoLOG FLEXPEN) 100 UNIT/ML solution pen-injector sc pen, Inject  under the skin 3 (Three) Times a Day With Meals. (Patient not taking: Reported on 10/30/2024), Disp: , Rfl:     Insulin Glargine (LANTUS SOLOSTAR) 100 UNIT/ML injection pen, Inject  under the skin. (Patient not taking: Reported on 10/30/2024), Disp: , Rfl:     Lipitor 40 MG tablet, Take 1 tablet by mouth Daily. (Patient not taking: Reported on 10/30/2024), Disp: , Rfl:     ondansetron (ZOFRAN) 8 MG tablet, Take 1 tablet by mouth every eight hours as needed (Patient not taking: Reported on 10/30/2024), Disp: 10 tablet, Rfl: 0    ondansetron (ZOFRAN) 8 MG tablet, Take 1 tablet by mouth Every 8 (Eight) Hours As Needed. (Patient not taking: Reported on 10/30/2024), Disp: 10 tablet, Rfl: 0    TRULICITY 1.5 MG/0.5ML solution pen-injector, , Disp: , Rfl:     Allergies:   Allergies   Allergen Reactions    Sulfamethoxazole Nausea Only    Trimethoprim Nausea Only    Clindamycin Rash       I have reviewed and updated the patient's chief complaint, history of present illness, review of  "systems, past medical history, surgical history, family history, social history, medications and allergy list as appropriate.     Objective    Vital Signs:   Vitals:    10/30/24 1026   BP: 118/74   BP Location: Left arm   Patient Position: Sitting   Cuff Size: Adult   Pulse: 75   Temp: 97.3 °F (36.3 °C)   Weight: 70.8 kg (156 lb)   Height: 160 cm (63\")   PainSc:   8   PainLoc: Generalized       Body mass index is 27.63 kg/m².    Physical Exam:  General: The patient is well-developed and well nourished. Cooperative, alert and oriented x3. Affect is normal. Hydration appears normal.   HEENT: Normocephalic and atraumatic. No notable alopecia. Lids and conjunctiva are normal. Pupils are equal and sclera are clear. Oropharynx is clear   NECK: Supple without adenopathy, masses or thyromegaly.   CARDIOVASCULAR: Regular rate and rhythm. No murmurs, rubs or gallops   LUNGS: Effort is normal. Lungs are clear bilaterally.  ABDOMEN: Soft and non-tender without masses or hepatosplenomegaly..   EXTREMITIES: No edema.  No cyanosis or clubbing.  SKIN: Inspection and palpation are normal.  NEUROLOGIC: Gait is normal.  Deep tendon reflexes are 2+ and symmetric.  MUSCULOSKELETAL: Complete joint exam was performed. There was full range of motion of the shoulders, elbows, wrists and hands without soft tissue swelling synovitis or deformities except as noted. Fusion of left 2nd PIP from surgery. 2nd digit is subluxing towards the left. Degenerative changes are present.  Hips have good flexion and internal and external rotation.  Knees have no palpable effusions.  There is full extension and flexion.  Ankles and feet have no soft tissue swelling or synovitis.  BACK:  Straight without scoliosis    Joint Exam 10/30/2024     The following joints were examined and normal:   Left Glenohumeral, Right Glenohumeral, Left Elbow, Right Elbow, Left Wrist, Right Wrist, Left MCP 1, Right MCP 1, Left MCP 2, Right MCP 2, Left MCP 3, Right MCP 3, Left " MCP 4, Right MCP 4, Left MCP 5, Right MCP 5, Left IP (thumb), Right IP (thumb), Left PIP 2 (finger), Right PIP 2 (finger), Left PIP 3 (finger), Right PIP 3 (finger), Left PIP 4 (finger), Right PIP 4 (finger), Left PIP 5 (finger), Right PIP 5 (finger), Left Knee, Right Knee       Patient Global: 50 / 100  Provider Global: 10 / 100    Assessment & Plan  Psoriatic arthritis  Based on physical exam and review of most recent X rays 6/2018, suspect psoriatic arthritis  XR of the hands 6/2018 with changes consistent with erosive OA vs. 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    I see no active swelling or synovitis. .   Swollen joint count is: 0, Tender joint count is: 0, Physician global is: 1, VAS is: 7, Patient global is: 5.  She seems to be doing well with this dose of Cosentyx  - Continue MTX 8 tablets weekly  - Continue daily folic acid   - continue meloxicam as needed  - she will continue to get labs every 8-12 weeks on medications  - RTC 3 months  CNS vasculitis  Dx: 2006 by Dr. Parker and Dr. Robles based on appearance of angiogram.  No brain biopsy.  She had an updated MRI brain and MRA without progression or evidence of ongoing inflammation in 2017 at [facility].  Previous Rx: Rituximab, Imuran,   Current Rx: Methotrexate (7/2020)   s/p Workup at [facility] with no findings of active CNS vasculitis.  Previously on Cytoxan and prednisone.  Currently no symptoms except stable chronic HA's. .     High risk medication use  MTX, Cosentyx.     CBC, CMP, ESR, and CRP every 12 weeks on current medications - new standing order provided.   - QTB and hepatitis panel were negative 10/6/23  - hepatitis panel negative 8/2/22  - Patient needs a Hepatitis panel every 5 years  and QTB yearly.      We discussed the possible side effects of methotrexate including, but not limited to: oral ulcers, nausea, diarrhea, rash, increased infection risk, bone marrow suppression, hepatitis, pulmonary toxicity.   We discussed the need to avoid alcohol and to have regulatory lab monitoring done while taking methotrexate. We discussed that live virus vaccines are contraindicated while taking methotrexate  Chronic fatigue    Age-related osteoporosis without current pathological fracture  DEXA: 10/19/2020: Improved in all areas with osteopenia in the lumbar spine and left hip.  Getting infusions of Reclast (since 7/2017; 9/2018, and 1/2020, 2/2021, 3/2022)  She had taken Fosamax for years prior to this   - Bone density today 07/13/23 which showed improvement in all areas with osteopenia and the lumbar spine and left hip.     last Reclast infusion was 3/2/22  - Continue drug holiday  - Order bone density after 07/13/25  - continue daily weight bearing exercise  - continue vitamin D supplementation  - calcium in diet  Restless legs syndrome (RLS)  On Ropinirole.     Orders Placed This Encounter   Procedures    CBC Auto Differential    Comprehensive Metabolic Panel    C-reactive Protein    Sedimentation Rate    QuantiFERON TB Gold       New Medications Ordered This Visit   Medications    rOPINIRole (REQUIP) 1 MG tablet     Sig: Take 1 tablet by mouth Every Night.     Dispense:  90 tablet     Refill:  0    amitriptyline (ELAVIL) 25 MG tablet     Sig: Take 1 tablet by mouth Every Night.     Dispense:  90 tablet     Refill:  1    folic acid (FOLVITE) 1 MG tablet     Sig: Take 1 tablet by mouth Daily.     Dispense:  90 tablet     Refill:  1    meloxicam (MOBIC) 7.5 MG tablet     Sig: Take 1 tablet by mouth Daily.     Dispense:  90 tablet     Refill:  1    methotrexate 2.5 MG tablet     Sig: Take 8 tablets by mouth 1 (One) Time Per Week.     Dispense:  96 tablet     Refill:  0            Follow Up:   Return in  about 3 months (around 1/30/2025).        Lane Magallon MD  [facility] [place] [facility]

## 2024-10-30 ENCOUNTER — LAB (OUTPATIENT)
Facility: HOSPITAL | Age: 64
End: 2024-10-30
Payer: MEDICARE

## 2024-10-30 ENCOUNTER — OFFICE VISIT (OUTPATIENT)
Age: 64
End: 2024-10-30
Payer: MEDICARE

## 2024-10-30 VITALS
HEART RATE: 75 BPM | SYSTOLIC BLOOD PRESSURE: 118 MMHG | BODY MASS INDEX: 27.64 KG/M2 | HEIGHT: 63 IN | WEIGHT: 156 LBS | DIASTOLIC BLOOD PRESSURE: 74 MMHG | TEMPERATURE: 97.3 F

## 2024-10-30 DIAGNOSIS — R53.82 CHRONIC FATIGUE: ICD-10-CM

## 2024-10-30 DIAGNOSIS — L40.50 PSORIATIC ARTHRITIS: ICD-10-CM

## 2024-10-30 DIAGNOSIS — G25.81 RESTLESS LEGS SYNDROME (RLS): ICD-10-CM

## 2024-10-30 DIAGNOSIS — L40.50 PSORIATIC ARTHRITIS: Primary | ICD-10-CM

## 2024-10-30 DIAGNOSIS — I77.6 CNS VASCULITIS: ICD-10-CM

## 2024-10-30 DIAGNOSIS — Z79.899 HIGH RISK MEDICATION USE: ICD-10-CM

## 2024-10-30 DIAGNOSIS — M81.0 AGE-RELATED OSTEOPOROSIS WITHOUT CURRENT PATHOLOGICAL FRACTURE: ICD-10-CM

## 2024-10-30 PROCEDURE — 36415 COLL VENOUS BLD VENIPUNCTURE: CPT

## 2024-10-30 PROCEDURE — 85025 COMPLETE CBC W/AUTO DIFF WBC: CPT

## 2024-10-30 PROCEDURE — 85652 RBC SED RATE AUTOMATED: CPT

## 2024-10-30 PROCEDURE — 86140 C-REACTIVE PROTEIN: CPT

## 2024-10-30 PROCEDURE — 80053 COMPREHEN METABOLIC PANEL: CPT

## 2024-10-30 PROCEDURE — 86480 TB TEST CELL IMMUN MEASURE: CPT

## 2024-10-30 RX ORDER — DEXTROMETHORPHAN HYDROBROMIDE AND PROMETHAZINE HYDROCHLORIDE 15; 6.25 MG/5ML; MG/5ML
5 SYRUP ORAL EVERY 6 HOURS
COMMUNITY
Start: 2024-09-27

## 2024-10-30 RX ORDER — METHOTREXATE 2.5 MG/1
20 TABLET ORAL WEEKLY
Qty: 96 TABLET | Refills: 0 | Status: SHIPPED | OUTPATIENT
Start: 2024-10-30

## 2024-10-30 RX ORDER — LEVOCETIRIZINE DIHYDROCHLORIDE 5 MG/1
TABLET, FILM COATED ORAL
COMMUNITY
Start: 2024-10-02

## 2024-10-30 RX ORDER — ROPINIROLE 1 MG/1
1 TABLET, FILM COATED ORAL NIGHTLY
Qty: 90 TABLET | Refills: 0 | Status: SHIPPED | OUTPATIENT
Start: 2024-10-30 | End: 2024-10-30 | Stop reason: SDUPTHER

## 2024-10-30 RX ORDER — MELOXICAM 7.5 MG/1
7.5 TABLET ORAL DAILY
Qty: 90 TABLET | Refills: 1 | Status: SHIPPED | OUTPATIENT
Start: 2024-10-30

## 2024-10-30 RX ORDER — DOXYCYCLINE 100 MG/1
1 CAPSULE ORAL EVERY 12 HOURS
COMMUNITY
Start: 2024-09-27

## 2024-10-30 RX ORDER — FOLIC ACID 1 MG/1
1 TABLET ORAL DAILY
Qty: 90 TABLET | Refills: 1 | Status: SHIPPED | OUTPATIENT
Start: 2024-10-30 | End: 2024-10-30 | Stop reason: SDUPTHER

## 2024-10-30 RX ORDER — MELOXICAM 7.5 MG/1
7.5 TABLET ORAL DAILY
Qty: 90 TABLET | Refills: 1 | Status: SHIPPED | OUTPATIENT
Start: 2024-10-30 | End: 2024-10-30 | Stop reason: SDUPTHER

## 2024-10-30 RX ORDER — ROPINIROLE 1 MG/1
1 TABLET, FILM COATED ORAL NIGHTLY
Qty: 90 TABLET | Refills: 0 | Status: SHIPPED | OUTPATIENT
Start: 2024-10-30

## 2024-10-30 RX ORDER — IBUPROFEN 800 MG/1
TABLET, FILM COATED ORAL
COMMUNITY
Start: 2024-10-22 | End: 2024-10-30

## 2024-10-30 RX ORDER — FOLIC ACID 1 MG/1
1 TABLET ORAL DAILY
Qty: 90 TABLET | Refills: 1 | Status: SHIPPED | OUTPATIENT
Start: 2024-10-30

## 2024-10-30 RX ORDER — METHOTREXATE 2.5 MG/1
20 TABLET ORAL WEEKLY
Qty: 96 TABLET | Refills: 0 | Status: SHIPPED | OUTPATIENT
Start: 2024-10-30 | End: 2024-10-30 | Stop reason: SDUPTHER

## 2024-10-31 LAB
ALBUMIN SERPL-MCNC: 4.3 G/DL (ref 3.5–5.2)
ALBUMIN/GLOB SERPL: 1.8 G/DL
ALP SERPL-CCNC: 82 U/L (ref 39–117)
ALT SERPL W P-5'-P-CCNC: 20 U/L (ref 1–33)
ANION GAP SERPL CALCULATED.3IONS-SCNC: 14.6 MMOL/L (ref 5–15)
AST SERPL-CCNC: 22 U/L (ref 1–32)
BASOPHILS # BLD AUTO: 0.05 10*3/MM3 (ref 0–0.2)
BASOPHILS NFR BLD AUTO: 0.7 % (ref 0–1.5)
BILIRUB SERPL-MCNC: 0.4 MG/DL (ref 0–1.2)
BUN SERPL-MCNC: 14 MG/DL (ref 8–23)
BUN/CREAT SERPL: 19.2 (ref 7–25)
CALCIUM SPEC-SCNC: 9.7 MG/DL (ref 8.6–10.5)
CHLORIDE SERPL-SCNC: 91 MMOL/L (ref 98–107)
CO2 SERPL-SCNC: 25.4 MMOL/L (ref 22–29)
CREAT SERPL-MCNC: 0.73 MG/DL (ref 0.57–1)
CRP SERPL-MCNC: 1.46 MG/DL (ref 0–0.5)
DEPRECATED RDW RBC AUTO: 41.2 FL (ref 37–54)
EGFRCR SERPLBLD CKD-EPI 2021: 90.8 ML/MIN/1.73
EOSINOPHIL # BLD AUTO: 0.1 10*3/MM3 (ref 0–0.4)
EOSINOPHIL NFR BLD AUTO: 1.4 % (ref 0.3–6.2)
ERYTHROCYTE [DISTWIDTH] IN BLOOD BY AUTOMATED COUNT: 13.3 % (ref 12.3–15.4)
ERYTHROCYTE [SEDIMENTATION RATE] IN BLOOD: 11 MM/HR (ref 0–30)
GLOBULIN UR ELPH-MCNC: 2.4 GM/DL
GLUCOSE SERPL-MCNC: 93 MG/DL (ref 65–99)
HCT VFR BLD AUTO: 35.5 % (ref 34–46.6)
HGB BLD-MCNC: 11.8 G/DL (ref 12–15.9)
IMM GRANULOCYTES # BLD AUTO: 0.02 10*3/MM3 (ref 0–0.05)
IMM GRANULOCYTES NFR BLD AUTO: 0.3 % (ref 0–0.5)
LYMPHOCYTES # BLD AUTO: 0.63 10*3/MM3 (ref 0.7–3.1)
LYMPHOCYTES NFR BLD AUTO: 8.9 % (ref 19.6–45.3)
MCH RBC QN AUTO: 29.1 PG (ref 26.6–33)
MCHC RBC AUTO-ENTMCNC: 33.2 G/DL (ref 31.5–35.7)
MCV RBC AUTO: 87.4 FL (ref 79–97)
MONOCYTES # BLD AUTO: 0.44 10*3/MM3 (ref 0.1–0.9)
MONOCYTES NFR BLD AUTO: 6.2 % (ref 5–12)
NEUTROPHILS NFR BLD AUTO: 5.86 10*3/MM3 (ref 1.7–7)
NEUTROPHILS NFR BLD AUTO: 82.5 % (ref 42.7–76)
NRBC BLD AUTO-RTO: 0 /100 WBC (ref 0–0.2)
PLATELET # BLD AUTO: 386 10*3/MM3 (ref 140–450)
PMV BLD AUTO: 9.9 FL (ref 6–12)
POTASSIUM SERPL-SCNC: 4.5 MMOL/L (ref 3.5–5.2)
PROT SERPL-MCNC: 6.7 G/DL (ref 6–8.5)
RBC # BLD AUTO: 4.06 10*6/MM3 (ref 3.77–5.28)
SODIUM SERPL-SCNC: 131 MMOL/L (ref 136–145)
WBC NRBC COR # BLD AUTO: 7.1 10*3/MM3 (ref 3.4–10.8)

## 2024-11-01 LAB
GAMMA INTERFERON BACKGROUND BLD IA-ACNC: 0.31 IU/ML
M TB IFN-G BLD-IMP: NEGATIVE
M TB IFN-G CD4+ BCKGRND COR BLD-ACNC: 0.31 IU/ML
M TB IFN-G CD4+CD8+ BCKGRND COR BLD-ACNC: 0.32 IU/ML
MITOGEN IGNF BCKGRD COR BLD-ACNC: >10 IU/ML
SERVICE CMNT-IMP: NORMAL

## 2024-11-04 ENCOUNTER — TELEPHONE (OUTPATIENT)
Age: 64
End: 2024-11-04
Payer: MEDICARE

## 2024-11-11 ENCOUNTER — SPECIALTY PHARMACY (OUTPATIENT)
Age: 64
End: 2024-11-11
Payer: MEDICARE

## 2024-11-11 RX ORDER — ROPINIROLE 1 MG/1
1 TABLET, FILM COATED ORAL NIGHTLY
Qty: 90 TABLET | Refills: 0 | Status: SHIPPED | OUTPATIENT
Start: 2024-11-11

## 2024-11-11 NOTE — PROGRESS NOTES
PAP letter and application mailed on 10/31     Vicky Cortes, Pharmacy Technician  Specialty Pharmacy Technician

## 2024-11-20 RX ORDER — METHOTREXATE 2.5 MG/1
TABLET ORAL
Qty: 96 TABLET | Refills: 3 | Status: SHIPPED | OUTPATIENT
Start: 2024-11-20

## 2024-12-18 RX ORDER — AMITRIPTYLINE HYDROCHLORIDE 10 MG/1
10 TABLET ORAL NIGHTLY
Qty: 90 TABLET | Refills: 3 | OUTPATIENT
Start: 2024-12-18

## 2025-01-17 ENCOUNTER — TELEPHONE (OUTPATIENT)
Age: 65
End: 2025-01-17
Payer: MEDICARE

## 2025-01-17 NOTE — TELEPHONE ENCOUNTER
Hub staff attempted to follow warm transfer process and was unsuccessful     Caller: Angie Wellington    Relationship to patient: Self    Best call back number: [phone]    Patient is needing: PT WANTED NAOMIE TO KNOW THAT SHE FAXED HER THE PAPERS FOR HER NOVARIS

## 2025-02-13 NOTE — ASSESSMENT & PLAN NOTE
Dx: 2006 by Dr. Parker and Dr. Robles based on appearance of angiogram.  No brain biopsy.  She had an updated MRI brain and MRA without progression or evidence of ongoing inflammation in 2017 at [facility].  Previous Rx: Rituximab, Imuran,   Current Rx: Methotrexate (7/2020)   s/p Workup at [facility] with no findings of active CNS vasculitis.  Previously on Cytoxan and prednisone.    Currently no symptoms except stable chronic HAs.

## 2025-02-13 NOTE — ASSESSMENT & PLAN NOTE
MTX, Cosentyx  No recent serious infections    - Continue labs every 8-12 weeks  - QTB negative 10/30/24  - hepatitis panel negative 8/2/22     We discussed the possible side effects of methotrexate including, but not limited to: oral ulcers, nausea, diarrhea, rash, increased infection risk, bone marrow suppression, hepatitis, pulmonary toxicity.   We discussed the need to avoid alcohol and to have regulatory lab monitoring done while taking methotrexate. We discussed that live virus vaccines are contraindicated while taking methotrexate

## 2025-02-13 NOTE — ASSESSMENT & PLAN NOTE
DEXA: 10/19/2020: Improved in all areas with osteopenia in the lumbar spine and left hip.  Getting infusions of Reclast (since 7/2017; 9/2018, and 1/2020, 2/2021, 3/2022)  She had taken Fosamax for years prior to this   - Bone density today 07/13/23 which showed improvement in all areas with osteopenia and the lumbar spine and left hip.    - last Reclast infusion was 3/2/22  - Continue drug holiday  - Order bone density after 07/13/25  - continue daily weight bearing exercise  - continue vitamin D supplementation  - calcium in diet

## 2025-02-13 NOTE — PROGRESS NOTES
Office Follow Up      Date: 02/18/2025   Patient Name: Angie Wellington  MRN: [id number]  YOB: 1960    Referring Physician: Soledad Chin DO     Chief Complaint: Psoriatic arthritis and h/o CNS vasculitis.       History of Present Illness: Angie Wellington is a 64 y.o. female who is here today for follow up on Psoriatic arthritis and h/o CNS vasculitis.     She is feeling worse.   She rates her pain 7/10 with 2 hours of morning stiffness.   She is currently on Methotrexate 20 mg weekly, daily folic acid, and Cosentyx.   She reports she recently did not have her shot on time due to patient assistance forms being delayed.   Since missing her Cosentyx dose, she does note increased pain and swelling particularly in her left 2nd-4th fingers.   She also notes right foot pain. No recent injury she can recall. She notes the pain is more medial.   We also prescribe her amitriptyline, meloxicam, and ropinirole. She reports she only takes meloxicam when she is hurting severely.    She has no current symptoms of CNS vasculitis.  Strong family history of psoriasis and RA. Sister diagnosed with seronegative RA.    She had been getting reclast but is currently on a drug holiday. DEXA 7/2023 was stable.    Subjective   Review of Systems:   Positive for fatigue, dry mouth, dry eyes, GERD, indigestion, joint pain, and swelling, neck stiffness, cold and heat intolerance, thirst, excessive urination, memory problem, speech difficulty, anxiety, nail changes, Raynaud's.  Otherwise negative ROS.    Medications:   Current Outpatient Medications:     albuterol (PROVENTIL) (2.5 MG/3ML) 0.083% nebulizer solution, Take 2.5 mg by nebulization Every 4 (Four) Hours As Needed., Disp: , Rfl:     amitriptyline (ELAVIL) 25 MG tablet, Take 1 tablet by mouth Every Night., Disp: 90 tablet, Rfl: 1    amoxicillin-clavulanate (AUGMENTIN) 875-125 MG per tablet, , Disp: , Rfl:     aspirin 81 MG chewable tablet, Chew 1  tablet., Disp: , Rfl:     Calcium-Magnesium-Vitamin D (CALCIUM 500 PO), 1,500 mg Daily., Disp: , Rfl:     escitalopram (LEXAPRO) 20 MG tablet, 1 tablet Daily., Disp: , Rfl:     folic acid (FOLVITE) 1 MG tablet, Take 1 tablet by mouth Daily., Disp: 90 tablet, Rfl: 1    hydrALAZINE (APRESOLINE) 25 MG tablet, Take 1 tablet by mouth 2 (Two) Times a Day With Meals., Disp: , Rfl:      MG tablet, Take 0.5 tablets by mouth Every 8 (Eight) Hours As Needed., Disp: , Rfl:     lansoprazole (PREVACID) 30 MG capsule, 1 capsule Daily., Disp: , Rfl:     levocetirizine (XYZAL) 5 MG tablet, , Disp: , Rfl:     losartan (COZAAR) 100 MG tablet, 1 tablet Daily., Disp: , Rfl:     meloxicam (MOBIC) 7.5 MG tablet, Take 1 tablet by mouth Daily., Disp: 90 tablet, Rfl: 1    metFORMIN (GLUCOPHAGE) 1000 MG tablet, TAKE 1 TABLET TWICE DAILY WITH MORNING AND EVENING MEALS, Disp: , Rfl:     methocarbamol (ROBAXIN) 500 MG tablet, Take 2 tablets by mouth 2 (Two) Times a Day., Disp: , Rfl:     methotrexate 2.5 MG tablet, TAKE 8 TABLETS ONE TIME WEEKLY, Disp: 96 tablet, Rfl: 3    metoprolol succinate XL (TOPROL-XL) 50 MG 24 hr tablet, 1 tablet Daily., Disp: , Rfl:     Norvasc 2.5 MG tablet, Take 1 tablet by mouth Daily., Disp: , Rfl:     promethazine-dextromethorphan (PROMETHAZINE-DM) 6.25-15 MG/5ML syrup, Take 5 mL by mouth Every 6 (Six) Hours., Disp: , Rfl:     rOPINIRole (REQUIP) 1 MG tablet, TAKE 1 TABLET EVERY NIGHT, Disp: 90 tablet, Rfl: 0    Secukinumab (Cosentyx Sensoready Pen) 150 MG/ML solution auto-injector, Inject 150 mg under the skin into the appropriate area as directed Every 28 (Twenty-Eight) Days., Disp: , Rfl:     zoledronic acid (Reclast) 5 MG/100ML solution infusion, Reclast 5 mg/100 mL intravenous piggyback  Inject by intravenous route., Disp: , Rfl:     methylPREDNISolone (MEDROL) 4 MG dose pack, Take as directed on package instructions., Disp: 1 each, Rfl: 0    Allergies:   Allergies   Allergen Reactions     "Sulfamethoxazole Nausea Only    Trimethoprim Nausea Only    Clindamycin Rash       I have reviewed and updated the patient's chief complaint, history of present illness, review of systems, past medical history, surgical history, family history, social history, medications and allergy list as appropriate.     Objective    Vital Signs:   Vitals:    02/18/25 1104   BP: 120/68   Temp: 98 °F (36.7 °C)   Weight: 69.4 kg (153 lb)   Height: 160 cm (63\")   PainSc: 7    PainLoc: Hand  Comment: wrists, fingers.         Body mass index is 27.1 kg/m².  Defer to PCP      Physical Exam:  General: The patient is well-developed and well nourished. Cooperative, alert and oriented x3. Affect is normal. Hydration appears normal.   HEENT: Normocephalic and atraumatic. No notable alopecia. Lids and conjunctiva are normal. Pupils are equal and sclera are clear. Oropharynx is clear   NECK: Supple without adenopathy, masses or thyromegaly.   CARDIOVASCULAR: Regular rate and rhythm.   LUNGS: Effort is normal..  ABDOMEN: Not examined  EXTREMITIES: No edema.  No cyanosis or clubbing.  SKIN: Inspection and palpation are normal.  NEUROLOGIC: Gait is normal.  Deep tendon reflexes are 2+ and symmetric.  MUSCULOSKELETAL: Complete joint exam was performed. There was full range of motion of the shoulders, elbows, wrists and hands without soft tissue swelling synovitis or deformities except as noted. Fusion of left 2nd PIP from surgery. 2nd digit is subluxing towards the left. She has swelling in left 3rd and 4th MCP, PIP, and DIPs. Degenerative changes are present throughout hands. Knees have no palpable effusions. There is full extension and flexion. Ankles and feet have no soft tissue swelling or synovitis.  BACK:  Straight without scoliosis    Joint Exam 02/18/2025        Right  Left   MCP 3     Swollen Tender   MCP 4     Swollen Tender   PIP 3 (finger)     Swollen Tender   PIP 4 (finger)     Swollen Tender     The following joints were examined " and normal:   Left Glenohumeral, Right Glenohumeral, Left Elbow, Right Elbow, Left Wrist, Right Wrist, Left MCP 1, Right MCP 1, Left MCP 2, Right MCP 2, Right MCP 3, Right MCP 4, Left MCP 5, Right MCP 5, Left IP (thumb), Right IP (thumb), Left PIP 2 (finger), Right PIP 2 (finger), Right PIP 3 (finger), Right PIP 4 (finger), Left PIP 5 (finger), Right PIP 5 (finger), Left Knee, Right Knee       Patient Global: 65 / 100  Provider Global: 30 / 100    Assessment & Plan  Psoriatic arthritis  Based on physical exam and review of most recent X rays 6/2018, suspect psoriatic arthritis  XR of the hands 6/2018 with changes consistent with erosive OA vs. 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    She has active synovitis today in left 3rd and 4th fingers, likely due to recently missing dose of her Cosentyx  Swollen joint count is: 4, Tender joint count is 4, Physician global is 3, VAS 7, Patient global is: 6.5.  - Will send Medrol dose pack for flare  - Continue Cosentyx. She now has doses at home and restarted this past Saturday after missing 2 doses due to patient assistance issue.  - Continue MTX 8 tablets weekly  - Continue daily folic acid   - continue meloxicam as needed  - she will continue to get labs every 8-12 weeks on medications  - RTC 3 months  High risk medication use  MTX, Cosentyx  No recent serious infections    - Continue labs every 8-12 weeks  - QTB negative 10/30/24  - hepatitis panel negative 8/2/22     We discussed the possible side effects of methotrexate including, but not limited to: oral ulcers, nausea, diarrhea, rash, increased infection risk, bone marrow suppression, hepatitis, pulmonary toxicity.   We discussed the need to avoid alcohol and  to have regulatory lab monitoring done while taking methotrexate. We discussed that live virus vaccines are contraindicated while taking methotrexate  CNS vasculitis  Dx: 2006 by Dr. Parker and Dr. Robles based on appearance of angiogram.  No brain biopsy.  She had an updated MRI brain and MRA without progression or evidence of ongoing inflammation in 2017 at [facility].  Previous Rx: Rituximab, Imuran,   Current Rx: Methotrexate (7/2020)   s/p Workup at [facility] with no findings of active CNS vasculitis.  Previously on Cytoxan and prednisone.    Currently no symptoms except stable chronic HAs.   Age-related osteoporosis without current pathological fracture  DEXA: 10/19/2020: Improved in all areas with osteopenia in the lumbar spine and left hip.  Getting infusions of Reclast (since 7/2017; 9/2018, and 1/2020, 2/2021, 3/2022)  She had taken Fosamax for years prior to this   - Bone density today 07/13/23 which showed improvement in all areas with osteopenia and the lumbar spine and left hip.    - last Reclast infusion was 3/2/22  - Continue drug holiday  - Order bone density after 07/13/25  - continue daily weight bearing exercise  - continue vitamin D supplementation  - calcium in diet  Encounter for medication monitoring  Update hepatitis panel  Fatigue, unspecified type  Update QTB today  Right foot pain  No injury she can recall  Will XR right foot today      Follow Up:   Return in about 4 months (around 6/18/2025) for Dr. Magallon, TOM Ingram.        TOM Ingram  [facility] [place]

## 2025-02-13 NOTE — ASSESSMENT & PLAN NOTE
Based on physical exam and review of most recent X rays 6/2018, suspect psoriatic arthritis  XR of the hands 6/2018 with changes consistent with erosive OA vs. 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    She has active synovitis today in left 3rd and 4th fingers, likely due to recently missing dose of her Cosentyx  Swollen joint count is: 4, Tender joint count is 4, Physician global is 3, VAS 7, Patient global is: 6.5.  - Will send Medrol dose pack for flare  - Continue Cosentyx. She now has doses at home and restarted this past Saturday after missing 2 doses due to patient assistance issue.  - Continue MTX 8 tablets weekly  - Continue daily folic acid   - continue meloxicam as needed  - she will continue to get labs every 8-12 weeks on medications  - RTC 3 months

## 2025-02-17 ENCOUNTER — TELEPHONE (OUTPATIENT)
Age: 65
End: 2025-02-17
Payer: MEDICARE

## 2025-02-18 ENCOUNTER — LAB (OUTPATIENT)
Facility: HOSPITAL | Age: 65
End: 2025-02-18
Payer: MEDICARE

## 2025-02-18 ENCOUNTER — OFFICE VISIT (OUTPATIENT)
Age: 65
End: 2025-02-18
Payer: MEDICARE

## 2025-02-18 VITALS
BODY MASS INDEX: 27.11 KG/M2 | DIASTOLIC BLOOD PRESSURE: 68 MMHG | WEIGHT: 153 LBS | SYSTOLIC BLOOD PRESSURE: 120 MMHG | HEIGHT: 63 IN | TEMPERATURE: 98 F

## 2025-02-18 DIAGNOSIS — Z51.81 ENCOUNTER FOR MEDICATION MONITORING: ICD-10-CM

## 2025-02-18 DIAGNOSIS — R53.83 FATIGUE, UNSPECIFIED TYPE: ICD-10-CM

## 2025-02-18 DIAGNOSIS — I77.6 CNS VASCULITIS: ICD-10-CM

## 2025-02-18 DIAGNOSIS — M79.671 RIGHT FOOT PAIN: ICD-10-CM

## 2025-02-18 DIAGNOSIS — Z79.899 HIGH RISK MEDICATION USE: ICD-10-CM

## 2025-02-18 DIAGNOSIS — M81.0 AGE-RELATED OSTEOPOROSIS WITHOUT CURRENT PATHOLOGICAL FRACTURE: ICD-10-CM

## 2025-02-18 DIAGNOSIS — L40.50 PSORIATIC ARTHRITIS: Primary | ICD-10-CM

## 2025-02-18 DIAGNOSIS — L40.50 PSORIATIC ARTHRITIS: ICD-10-CM

## 2025-02-18 PROBLEM — M15.9 OSTEOARTHRITIS OF MULTIPLE JOINTS: Status: ACTIVE | Noted: 2025-02-18

## 2025-02-18 PROBLEM — K21.9 CHRONIC GERD: Status: ACTIVE | Noted: 2025-02-18

## 2025-02-18 PROBLEM — G43.719 INTRACTABLE CHRONIC MIGRAINE WITHOUT AURA AND WITHOUT STATUS MIGRAINOSUS: Status: ACTIVE | Noted: 2017-05-22

## 2025-02-18 PROBLEM — I10 ESSENTIAL HYPERTENSION: Status: ACTIVE | Noted: 2025-02-18

## 2025-02-18 PROBLEM — M35.00 SJOGREN'S SYNDROME: Status: ACTIVE | Noted: 2017-05-22

## 2025-02-18 LAB
BASOPHILS # BLD AUTO: 0.03 10*3/MM3 (ref 0–0.2)
BASOPHILS NFR BLD AUTO: 0.6 % (ref 0–1.5)
CRP SERPL-MCNC: 0.51 MG/DL (ref 0–0.5)
DEPRECATED RDW RBC AUTO: 42.2 FL (ref 37–54)
EOSINOPHIL # BLD AUTO: 0.27 10*3/MM3 (ref 0–0.4)
EOSINOPHIL NFR BLD AUTO: 5.3 % (ref 0.3–6.2)
ERYTHROCYTE [DISTWIDTH] IN BLOOD BY AUTOMATED COUNT: 13.8 % (ref 12.3–15.4)
ERYTHROCYTE [SEDIMENTATION RATE] IN BLOOD: 20 MM/HR (ref 0–30)
HCT VFR BLD AUTO: 35.9 % (ref 34–46.6)
HGB BLD-MCNC: 12.2 G/DL (ref 12–15.9)
IMM GRANULOCYTES # BLD AUTO: 0.02 10*3/MM3 (ref 0–0.05)
IMM GRANULOCYTES NFR BLD AUTO: 0.4 % (ref 0–0.5)
LYMPHOCYTES # BLD AUTO: 1.24 10*3/MM3 (ref 0.7–3.1)
LYMPHOCYTES NFR BLD AUTO: 24.4 % (ref 19.6–45.3)
MCH RBC QN AUTO: 29.8 PG (ref 26.6–33)
MCHC RBC AUTO-ENTMCNC: 34 G/DL (ref 31.5–35.7)
MCV RBC AUTO: 87.6 FL (ref 79–97)
MONOCYTES # BLD AUTO: 0.38 10*3/MM3 (ref 0.1–0.9)
MONOCYTES NFR BLD AUTO: 7.5 % (ref 5–12)
NEUTROPHILS NFR BLD AUTO: 3.15 10*3/MM3 (ref 1.7–7)
NEUTROPHILS NFR BLD AUTO: 61.8 % (ref 42.7–76)
NRBC BLD AUTO-RTO: 0 /100 WBC (ref 0–0.2)
PLATELET # BLD AUTO: 427 10*3/MM3 (ref 140–450)
PMV BLD AUTO: 9.6 FL (ref 6–12)
RBC # BLD AUTO: 4.1 10*6/MM3 (ref 3.77–5.28)
WBC NRBC COR # BLD AUTO: 5.09 10*3/MM3 (ref 3.4–10.8)

## 2025-02-18 PROCEDURE — 1160F RVW MEDS BY RX/DR IN RCRD: CPT | Performed by: NURSE PRACTITIONER

## 2025-02-18 PROCEDURE — 3078F DIAST BP <80 MM HG: CPT | Performed by: NURSE PRACTITIONER

## 2025-02-18 PROCEDURE — 85025 COMPLETE CBC W/AUTO DIFF WBC: CPT

## 2025-02-18 PROCEDURE — 85652 RBC SED RATE AUTOMATED: CPT

## 2025-02-18 PROCEDURE — 36415 COLL VENOUS BLD VENIPUNCTURE: CPT

## 2025-02-18 PROCEDURE — 1159F MED LIST DOCD IN RCRD: CPT | Performed by: NURSE PRACTITIONER

## 2025-02-18 PROCEDURE — 3074F SYST BP LT 130 MM HG: CPT | Performed by: NURSE PRACTITIONER

## 2025-02-18 PROCEDURE — 99214 OFFICE O/P EST MOD 30 MIN: CPT | Performed by: NURSE PRACTITIONER

## 2025-02-18 PROCEDURE — 80053 COMPREHEN METABOLIC PANEL: CPT

## 2025-02-18 PROCEDURE — 86140 C-REACTIVE PROTEIN: CPT

## 2025-02-18 RX ORDER — ROPINIROLE 1 MG/1
1 TABLET, FILM COATED ORAL NIGHTLY
Qty: 90 TABLET | Refills: 0 | Status: CANCELLED | OUTPATIENT
Start: 2025-02-18

## 2025-02-18 RX ORDER — METHYLPREDNISOLONE 4 MG/1
TABLET ORAL
Qty: 1 EACH | Refills: 0 | Status: SHIPPED | OUTPATIENT
Start: 2025-02-18

## 2025-02-18 RX ORDER — IBUPROFEN 800 MG/1
400 TABLET ORAL EVERY 8 HOURS PRN
COMMUNITY
Start: 2024-11-25

## 2025-02-18 RX ORDER — FOLIC ACID 1 MG/1
1 TABLET ORAL DAILY
Qty: 90 TABLET | Refills: 1 | Status: CANCELLED | OUTPATIENT
Start: 2025-02-18

## 2025-02-18 RX ORDER — ALBUTEROL SULFATE 0.83 MG/ML
2.5 SOLUTION RESPIRATORY (INHALATION) EVERY 4 HOURS PRN
COMMUNITY
Start: 2024-12-14

## 2025-02-18 RX ORDER — METHOTREXATE 2.5 MG/1
20 TABLET ORAL WEEKLY
Qty: 96 TABLET | Refills: 1 | Status: CANCELLED | OUTPATIENT
Start: 2025-02-18

## 2025-02-18 RX ORDER — MELOXICAM 7.5 MG/1
7.5 TABLET ORAL DAILY
Qty: 90 TABLET | Refills: 1 | Status: CANCELLED | OUTPATIENT
Start: 2025-02-18

## 2025-02-19 ENCOUNTER — PATIENT MESSAGE (OUTPATIENT)
Age: 65
End: 2025-02-19
Payer: MEDICARE

## 2025-02-19 LAB
ALBUMIN SERPL-MCNC: 4.1 G/DL (ref 3.5–5.2)
ALBUMIN/GLOB SERPL: 1.5 G/DL
ALP SERPL-CCNC: 87 U/L (ref 39–117)
ALT SERPL W P-5'-P-CCNC: 16 U/L (ref 1–33)
ANION GAP SERPL CALCULATED.3IONS-SCNC: 12.3 MMOL/L (ref 5–15)
AST SERPL-CCNC: 23 U/L (ref 1–32)
BILIRUB SERPL-MCNC: 0.3 MG/DL (ref 0–1.2)
BUN SERPL-MCNC: 23 MG/DL (ref 8–23)
BUN/CREAT SERPL: 30.3 (ref 7–25)
CALCIUM SPEC-SCNC: 9.5 MG/DL (ref 8.6–10.5)
CHLORIDE SERPL-SCNC: 97 MMOL/L (ref 98–107)
CO2 SERPL-SCNC: 27.7 MMOL/L (ref 22–29)
CREAT SERPL-MCNC: 0.76 MG/DL (ref 0.57–1)
EGFRCR SERPLBLD CKD-EPI 2021: 86.5 ML/MIN/1.73
GLOBULIN UR ELPH-MCNC: 2.7 GM/DL
GLUCOSE SERPL-MCNC: 133 MG/DL (ref 65–99)
POTASSIUM SERPL-SCNC: 4.7 MMOL/L (ref 3.5–5.2)
PROT SERPL-MCNC: 6.8 G/DL (ref 6–8.5)
SODIUM SERPL-SCNC: 137 MMOL/L (ref 136–145)

## 2025-06-06 RX ORDER — MELOXICAM 7.5 MG/1
7.5 TABLET ORAL DAILY
Qty: 90 TABLET | Refills: 0 | Status: SHIPPED | OUTPATIENT
Start: 2025-06-06

## 2025-06-06 NOTE — TELEPHONE ENCOUNTER
Rx Refill Note  Requested Prescriptions     Pending Prescriptions Disp Refills    amitriptyline (ELAVIL) 25 MG tablet 90 tablet 1     Sig: Take 1 tablet by mouth Every Night.    meloxicam (MOBIC) 7.5 MG tablet 90 tablet 0     Sig: Take 1 tablet by mouth Daily.      Last office visit with prescribing clinician: Visit date not found   Last telemedicine visit with prescribing clinician: Visit date not found   Next office visit with prescribing clinician: 6/24/2025                         Agnes Travis RN  06/06/25, 12:05 EDT